# Patient Record
Sex: MALE | Race: WHITE | NOT HISPANIC OR LATINO | URBAN - METROPOLITAN AREA
[De-identification: names, ages, dates, MRNs, and addresses within clinical notes are randomized per-mention and may not be internally consistent; named-entity substitution may affect disease eponyms.]

---

## 2021-02-09 ENCOUNTER — NEW PATIENT (OUTPATIENT)
Dept: URBAN - METROPOLITAN AREA CLINIC 64 | Facility: CLINIC | Age: 46
End: 2021-02-09
Payer: COMMERCIAL

## 2021-02-09 DIAGNOSIS — H25.13 AGE-RELATED NUCLEAR CATARACT, BILATERAL: ICD-10-CM

## 2021-02-09 DIAGNOSIS — H52.13 MYOPIA, BILATERAL: Primary | ICD-10-CM

## 2021-02-09 PROCEDURE — 99204 OFFICE O/P NEW MOD 45 MIN: CPT | Performed by: STUDENT IN AN ORGANIZED HEALTH CARE EDUCATION/TRAINING PROGRAM

## 2021-02-09 ASSESSMENT — INTRAOCULAR PRESSURE
OD: 11
OS: 9

## 2021-02-09 ASSESSMENT — KERATOMETRY
OS: 39.20
OD: 39.62

## 2021-02-24 ENCOUNTER — FOLLOW UP ESTABLISHED (OUTPATIENT)
Dept: URBAN - METROPOLITAN AREA CLINIC 64 | Facility: CLINIC | Age: 46
End: 2021-02-24
Payer: COMMERCIAL

## 2021-02-24 DIAGNOSIS — H25.012 CORTICAL AGE-RELATED CATARACT, LEFT EYE: ICD-10-CM

## 2021-02-24 DIAGNOSIS — H52.221 REGULAR ASTIGMATISM, RIGHT EYE: ICD-10-CM

## 2021-02-24 DIAGNOSIS — H25.041 POSTERIOR SUBCAPSULAR POLAR AGE-RELATED CATARACT, RIGHT EYE: Primary | ICD-10-CM

## 2021-02-24 PROCEDURE — 99211 OFF/OP EST MAY X REQ PHY/QHP: CPT | Performed by: OPHTHALMOLOGY

## 2021-02-24 ASSESSMENT — VISUAL ACUITY
OD: 20/30
OS: 20/20

## 2021-02-24 ASSESSMENT — INTRAOCULAR PRESSURE
OS: 14
OD: 12

## 2021-04-09 ENCOUNTER — ADULT PHYSICAL (OUTPATIENT)
Dept: URBAN - METROPOLITAN AREA CLINIC 64 | Facility: CLINIC | Age: 46
End: 2021-04-09
Payer: COMMERCIAL

## 2021-04-09 DIAGNOSIS — Z01.818 ENCOUNTER FOR OTHER PREPROCEDURAL EXAMINATION: Primary | ICD-10-CM

## 2021-04-09 PROCEDURE — 99203 OFFICE O/P NEW LOW 30 MIN: CPT | Performed by: NURSE PRACTITIONER

## 2021-04-09 RX ORDER — FLUTICASONE PROPIONATE 55 UG/1
POWDER, METERED RESPIRATORY (INHALATION)
Qty: 0 | Refills: 0 | Status: INACTIVE
Start: 2021-04-09 | End: 2021-09-30

## 2021-04-09 RX ORDER — FLUTICASONE PROPIONATE 55 UG/1
POWDER, METERED RESPIRATORY (INHALATION)
Qty: 0 | Refills: 0 | Status: INACTIVE
Start: 2021-04-09 | End: 2021-04-09

## 2021-05-06 ENCOUNTER — SURGERY (OUTPATIENT)
Dept: URBAN - METROPOLITAN AREA SURGERY 50 | Facility: SURGERY | Age: 46
End: 2021-05-06
Payer: COMMERCIAL

## 2021-05-07 ENCOUNTER — POST-OPERATIVE VISIT (OUTPATIENT)
Dept: URBAN - METROPOLITAN AREA CLINIC 64 | Facility: CLINIC | Age: 46
End: 2021-05-07

## 2021-05-07 DIAGNOSIS — Z48.810 ENCOUNTER FOR SURGICAL AFTERCARE FOLLOWING SURGERY ON A SENSE ORGAN: Primary | ICD-10-CM

## 2021-05-07 PROCEDURE — 99024 POSTOP FOLLOW-UP VISIT: CPT | Performed by: OPTOMETRIST

## 2021-05-07 ASSESSMENT — INTRAOCULAR PRESSURE: OD: 10

## 2021-05-07 NOTE — IMPRESSION/PLAN
Impression: S/P CE/Premium IOL OD - 1 Day. Encounter for surgical aftercare following surgery on a sense organ  Z48.810.  Excellent post op course Plan:

## 2021-07-12 ENCOUNTER — POST-OPERATIVE VISIT (OUTPATIENT)
Dept: URBAN - METROPOLITAN AREA CLINIC 64 | Facility: CLINIC | Age: 46
End: 2021-07-12
Payer: COMMERCIAL

## 2021-07-12 DIAGNOSIS — Z96.1 PRESENCE OF PSEUDOPHAKIA: ICD-10-CM

## 2021-07-12 DIAGNOSIS — H52.11 MYOPIA, RIGHT EYE: ICD-10-CM

## 2021-07-12 PROCEDURE — 99024 POSTOP FOLLOW-UP VISIT: CPT | Performed by: OPHTHALMOLOGY

## 2021-07-12 ASSESSMENT — INTRAOCULAR PRESSURE
OS: 10
OD: 10

## 2021-07-12 ASSESSMENT — VISUAL ACUITY
OS: 20/20
OD: 20/25-1

## 2021-07-12 NOTE — IMPRESSION/PLAN
Arterial Line Insertion  Date/Time: 4/23/2018 1:07 PM  Performed by: [place]  Authorized by: Miguel Angel Wood   Consent: Verbal consent obtained  Written consent obtained  Risks and benefits: risks, benefits and alternatives were discussed  Consent given by: patient  Preparation: Patient was prepped and draped in the usual sterile fashion    Indications: multiple ABGs and hemodynamic monitoring  Orientation:  RightLocation: radial artery    Sedation:  Patient sedated: yes (Performed under GA)  Vikas's test normal: yes  Needle gauge: 20  Number of attempts: 1  Post-procedure: dressing applied  Post-procedure CNS: normal  Patient tolerance: Patient tolerated the procedure well with no immediate complications Impression: Myopia, right eye: H52.11. Plan: Discussed that [address] and R&R are options for treating myopia. Pt has had previous LASIK. Proceed with AK OD and then re-evaluate vision.

## 2021-07-12 NOTE — IMPRESSION/PLAN
Impression:  Encounter for surgical aftercare following surgery on a sense organ  Z48.810. Plan: S/p CE/Vivity IOL. Doing well. Some dexycu noted on exam. May account for pt's complaint of floater. Some astigmatism and mild myopia remain. Discussed AK OD to correct astigmatism. Discussed that [address] and R&R are options to correct myopia. Discussed possible need for YAG in the future. Consider IOP gtts with Dr. Petar Ewing.

## 2021-09-08 ENCOUNTER — PROCEDURE (OUTPATIENT)
Dept: URBAN - METROPOLITAN AREA CLINIC 64 | Facility: CLINIC | Age: 46
End: 2021-09-08
Payer: COMMERCIAL

## 2021-09-08 DIAGNOSIS — H26.491 OTHER SECONDARY CATARACT, RIGHT EYE: ICD-10-CM

## 2021-09-08 PROCEDURE — 99212 OFFICE O/P EST SF 10 MIN: CPT | Performed by: OPHTHALMOLOGY

## 2021-09-08 ASSESSMENT — KERATOMETRY
OS: 39.31
OD: 39.92

## 2021-09-08 ASSESSMENT — VISUAL ACUITY: OD: 20/40

## 2021-09-08 NOTE — IMPRESSION/PLAN
Impression: Other secondary cataract, right eye: H26.491. Plan: Discussed. PCO on exam today.  Schedule YAG OD.

## 2021-09-08 NOTE — IMPRESSION/PLAN
Impression: Regular astigmatism, right eye: H52.221. Plan: Discussed. Recommend re-evaluating for AK OD in 3-4 weeks, after YAG, to make sure MRx is stable. Repeat MRx and pentacam again.

## 2021-09-08 NOTE — IMPRESSION/PLAN
Impression: Myopia, right eye: H52.11. Plan: Discussed that [address] is an option for treating myopia. Pt has had previous LASIK. Proceed with YAG OD, then AK OD and then re-evaluate vision. Pt will come back in 3-4 weeks (after YAG) to further evaluate for AK.

## 2021-09-30 ENCOUNTER — ADULT PHYSICAL (OUTPATIENT)
Dept: URBAN - METROPOLITAN AREA CLINIC 80 | Facility: CLINIC | Age: 46
End: 2021-09-30
Payer: COMMERCIAL

## 2021-09-30 PROCEDURE — 99213 OFFICE O/P EST LOW 20 MIN: CPT | Performed by: PHYSICIAN ASSISTANT

## 2021-10-13 ENCOUNTER — SURGERY (OUTPATIENT)
Dept: URBAN - METROPOLITAN AREA SURGERY 50 | Facility: SURGERY | Age: 46
End: 2021-10-13

## 2021-10-13 PROCEDURE — 66821 AFTER CATARACT LASER SURGERY: CPT | Performed by: OPHTHALMOLOGY

## 2021-10-20 ENCOUNTER — POST-OPERATIVE VISIT (OUTPATIENT)
Dept: URBAN - METROPOLITAN AREA CLINIC 64 | Facility: CLINIC | Age: 46
End: 2021-10-20
Payer: COMMERCIAL

## 2021-10-20 PROCEDURE — 99024 POSTOP FOLLOW-UP VISIT: CPT | Performed by: OPTOMETRIST

## 2021-10-20 ASSESSMENT — VISUAL ACUITY: OD: 20/20

## 2021-10-20 ASSESSMENT — INTRAOCULAR PRESSURE
OS: 14
OD: 14

## 2021-10-20 NOTE — IMPRESSION/PLAN
Impression: S/P YAG Capsulotomy (Yttrium Aluminum [place]) OD - 7 Days. Encounter for surgical aftercare following surgery on a sense organ  Z48.810. Plan: S/P YAG. Consider AK or Lasik.  See DLM

## 2021-11-15 ENCOUNTER — OFFICE VISIT (OUTPATIENT)
Dept: URBAN - METROPOLITAN AREA CLINIC 64 | Facility: CLINIC | Age: 46
End: 2021-11-15
Payer: COMMERCIAL

## 2021-11-15 PROCEDURE — 99212 OFFICE O/P EST SF 10 MIN: CPT | Performed by: OPHTHALMOLOGY

## 2021-11-15 ASSESSMENT — VISUAL ACUITY
OD: 20/15
OS: 20/15

## 2021-11-15 NOTE — IMPRESSION/PLAN
Impression: Regular astigmatism, right eye: H52.221. Plan: Proceed with Astigmatic Keratotomy today OD only.

## 2022-04-01 ENCOUNTER — OFFICE VISIT (OUTPATIENT)
Dept: URBAN - METROPOLITAN AREA CLINIC 64 | Facility: CLINIC | Age: 47
End: 2022-04-01
Payer: COMMERCIAL

## 2022-04-01 PROCEDURE — 99213 OFFICE O/P EST LOW 20 MIN: CPT | Performed by: OPTOMETRIST

## 2022-04-01 ASSESSMENT — VISUAL ACUITY: OD: 20/20

## 2022-04-01 ASSESSMENT — INTRAOCULAR PRESSURE
OD: 13
OS: 13

## 2022-04-01 NOTE — IMPRESSION/PLAN
Impression: Regular astigmatism, right eye: H52.221. Plan: S/p LASIK OU, Vivity IOL OD, YAG OD, AK OD. Pt is still having difficulty with distance and near vision. Pt is considering [place] [id number] touch up for remaining myopia and astigmatism, schedule L6 next available.